# Patient Record
(demographics unavailable — no encounter records)

---

## 2025-01-23 NOTE — BEGINNING OF VISIT
[1] : 2) Feeling down, depressed, or hopeless for several days (1) [Provided Coping Management Support] : Provided Coping Management Support [Pain Scale: ___] : On a scale of 1-10, today the patient's pain is a(n) [unfilled]. [Referred to Palliative/Pain Management] : Referred to Palliative/Pain Management [> 15 Years] : > 15 Years [Diarrhea Character] : Diarrhea: Grade 0 [[id number]] : 2 [Former] : Former [0-4] : 0-4 [Date Discussed (MM/DD/YY): ___] : Discussed: [unfilled] [With Patient/Caregiver] : with Patient/Caregiver [Abdominal Pain] : no abdominal pain [Vomiting] : no vomiting [Constipation] : no constipation

## 2025-01-23 NOTE — BEGINNING OF VISIT
[1] : 2) Feeling down, depressed, or hopeless for several days (1) [Provided Coping Management Support] : Provided Coping Management Support [Pain Scale: ___] : On a scale of 1-10, today the patient's pain is a(n) [unfilled]. [Referred to Palliative/Pain Management] : Referred to Palliative/Pain Management [> 15 Years] : > 15 Years [Diarrhea Character] : Diarrhea: Grade 0 [[id number]] : 2 [Former] : Former [0-4] : 0-4 [Date Discussed (MM/DD/YY): ___] : Discussed: [unfilled] [With Patient/Caregiver] : with Patient/Caregiver [Abdominal Pain] : no abdominal pain [Vomiting] : no vomiting [Constipation] : no constipation

## 2025-01-23 NOTE — BEGINNING OF VISIT
[1] : 2) Feeling down, depressed, or hopeless for several days (1) [Provided Coping Management Support] : Provided Coping Management Support [Pain Scale: ___] : On a scale of 1-10, today the patient's pain is a(n) [unfilled]. [Referred to Palliative/Pain Management] : Referred to Palliative/Pain Management [> 15 Years] : > 15 Years [Diarrhea Character] : Diarrhea: Grade 0 [[id number]] : 2 [Former] : Former [0-4] : 0-4 [Date Discussed (MM/DD/YY): ___] : Discussed: [unfilled] [With Patient/Caregiver] : with Patient/Caregiver [Abdominal Pain] : no abdominal pain [Vomiting] : no vomiting [Constipation] : no constipation

## 2025-01-23 NOTE — BEGINNING OF VISIT
[1] : 2) Feeling down, depressed, or hopeless for several days (1) [Provided Coping Management Support] : Provided Coping Management Support [Pain Scale: ___] : On a scale of 1-10, today the patient's pain is a(n) [unfilled]. [Referred to Palliative/Pain Management] : Referred to Palliative/Pain Management [> 15 Years] : > 15 Years [Diarrhea Character] : Diarrhea: Grade 0 [[id number]] : 2 [Former] : Former [0-4] : 0-4 [Date Discussed (MM/DD/YY): ___] : Discussed: [unfilled] [With Patient/Caregiver] : with Patient/Caregiver [Abdominal Pain] : no abdominal pain [Vomiting] : no vomiting [Constipation] : no constipation

## 2025-01-23 NOTE — BEGINNING OF VISIT
[1] : 2) Feeling down, depressed, or hopeless for several days (1) [Provided Coping Management Support] : Provided Coping Management Support [Pain Scale: ___] : On a scale of 1-10, today the patient's pain is a(n) [unfilled]. [Referred to Palliative/Pain Management] : Referred to Palliative/Pain Management [> 15 Years] : > 15 Years [Diarrhea Character] : Diarrhea: Grade 0 [[id number]] : 2 [Former] : Former [0-4] : 0-4 [Date Discussed (MM/DD/YY): ___] : Discussed: [unfilled] [With Patient/Caregiver] : with Patient/Caregiver [Abdominal Pain] : no abdominal pain [Vomiting] : no vomiting [Constipation] : no constipation

## 2025-01-24 NOTE — ASSESSMENT
[With Patient/Caregiver] : With Patient/Caregiver [Designated Health Care Proxy] : Designated Health Care Proxy [Name: ___] : Name: [unfilled] [Relationship: ___] : Relationship: [unfilled] [FreeTextEntry1] : 58 year old postmenopausal woman with bilateral synchronous breast cancers S/P bilateral mastectomies: Left Stage I ([id number]) invasive ductal carcinoma ER pos [place] neg HER2 1+ Right Stage 2 (T2=3.0cmN1) invasive ductal carcinoma, gr 3 inc Ki67,  ER/[place] pos HER2 2+, FISH neg, ORS 24 Pathology and prognosis reviewed in detail S/P RT  On anastrozole since 8/2024 - to continue tolerating well Agree with plan to add abemaciclib. Side effects and rationale reviewed today. Would start at 100mg bid . Also discussed kisquali. Agree with adjuvant bisphosphonate once stable on abemaciclib. Pt to f/u with Rheumatology MUTYH mutation autosomal recessive - no cancer implications.  Reviewed diet, wt loss encouraged, inc exercise, limit etoh, adeq ca++ and D    All of the patient's questions were adequately addressed and answered during today's consultation. Our discussion covered a range of topics including diagnosis, prognosis, treatment options, potential risks and alternatives, as well as overall care. The patient expressed agreement with the proposed plan. She was instructed to reach out to me if she had any further questions or if there were any changes in her clinical condition     [AdvancecareDate] : 1/23/25

## 2025-01-24 NOTE — REVIEW OF SYSTEMS
[Diarrhea: Grade 0] : Diarrhea: Grade 0 [Joint Pain] : joint pain [Anxiety] : anxiety [Hot Flashes] : hot flashes [Negative] : Allergic/Immunologic [FreeTextEntry8] : dryness, knees shoulders [FreeTextEntry9] : hands

## 2025-01-24 NOTE — PHYSICAL EXAM
[Fully active, able to carry on all pre-disease performance without restriction] : Status 0 - Fully active, able to carry on all pre-disease performance without restriction [Normal] : normal spine exam without palpable tenderness, no kyphosis or scoliosis [de-identified] : S/p bilat mastectomy Rt RT - o palp mass or LN

## 2025-01-24 NOTE — ASSESSMENT
[With Patient/Caregiver] : With Patient/Caregiver [Designated Health Care Proxy] : Designated Health Care Proxy [Name: ___] : Name: [unfilled] [Relationship: ___] : Relationship: [unfilled] [FreeTextEntry1] : 58 year old postmenopausal woman with bilateral synchronous breast cancers S/P bilateral mastectomies: Left Stage I ([id number]) invasive ductal carcinoma ER pos [place] neg HER2 1+ Right Stage 2 (T2=3.0cmN1) invasive ductal carcinoma, gr 3 inc Ki67,  ER/[place] pos HER2 2+, FISH neg, ORS 24 Pathology and prognosis reviewed in detail S/P RT  On anastrozole since 8/2024 - to continue tolerating well Agree with plan to add abemaciclib. Side effects and rationale reviewed today. Would start at 100mg bid . Also discussed kisquali. Agree with adjuvant bisphosphonate once stable on abemaciclib. Pt to f/u with Rheumatology MUTYH mutation autosomal recessive - no cancer implications.  Reviewed diet, wt loss encouraged, inc exercise, limit etoh, adeq ca++ and D    All of the patient's questions were adequately addressed and answered during today's consultation. Our discussion covered a range of topics including diagnosis, prognosis, treatment options, potential risks and alternatives, as well as overall care. The patient expressed agreement with the proposed plan. She was instructed to reach out to me if she had any further questions or if there were any changes in her clinical condition     [AdvancecareDate] : 1/23/25

## 2025-01-24 NOTE — PHYSICAL EXAM
[Fully active, able to carry on all pre-disease performance without restriction] : Status 0 - Fully active, able to carry on all pre-disease performance without restriction [Normal] : normal spine exam without palpable tenderness, no kyphosis or scoliosis [de-identified] : S/p bilat mastectomy Rt RT - o palp mass or LN

## 2025-01-24 NOTE — HISTORY OF PRESENT ILLNESS
[de-identified] : 57 yo self-referred for evaluation of breast cancer  4/15/24 right breast biopsies (CBL): 12ocN5 invasive ductal carcinoma, 1.6cm. ER>90 [place] 59% HER2 1+ ki67 44 2ocN2 DCIS 2024 breast MRI with right 12ocN5 2.9cm mass (known carcinoma); right central 0.5cm mass; left uiq 3.1cm mass. No lymphadenopathy  5/10/24 left breast MRI guided biopsy DCIS ER>95 [place]<1 24 bilateral mastectomies (Hu) with: Left: microinvasive carcinoma, 1mm. No LVI. 0/1 positive sentinel lymph node. ER>95 [place]<1 HER2 1+ Left M9fiyS2 RIGHT invasive ductal carcinoma, 30mm. Grade 3. +LVI. 1/3 sentinel lymph node with metastasis. ER>95 [place] 75 ki67 20-25 HER2 2+ FISH negative Oncotype  dx recurrence score= 24 drr 7% Right T2=3cmN1  XRT Dr NG 2024 anastrozole   BMD osteopenia  PMH Malignant neoplasm of breast	  Personal history of radiation therapy	  RA (rheumatoid arthritis)	2005 Vitamin D deficiency	  On antibody therapy for RA- well controlled. 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 . 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  Tobacco: none smoked  in 's Alcohol: 3 drinks per week Exercise: active walking

## 2025-01-24 NOTE — HISTORY OF PRESENT ILLNESS
[de-identified] : 59 yo self-referred for evaluation of breast cancer  4/15/24 right breast biopsies (CBL): 12ocN5 invasive ductal carcinoma, 1.6cm. ER>90 [place] 59% HER2 1+ ki67 44 2ocN2 DCIS 2024 breast MRI with right 12ocN5 2.9cm mass (known carcinoma); right central 0.5cm mass; left uiq 3.1cm mass. No lymphadenopathy  5/10/24 left breast MRI guided biopsy DCIS ER>95 [place]<1 24 bilateral mastectomies (Hu) with: Left: microinvasive carcinoma, 1mm. No LVI. 0/1 positive sentinel lymph node. ER>95 MD<1 HER2 1+ Left M4qbqP8 RIGHT invasive ductal carcinoma, 30mm. Grade 3. +LVI. 1/3 sentinel lymph node with metastasis. ER>95 MD 75 ki67 20-25 HER2 2+ FISH negative Oncotype  dx recurrence score= 24 drr 7% Right T2=3cmN1  XRT Dr NG 2024 anastrozole   BMD osteopenia  PMH Malignant neoplasm of breast	  Personal history of radiation therapy	  RA (rheumatoid arthritis)	2005 Vitamin D deficiency	  On antibody therapy for RA- well controlled. 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  Tobacco: none smoked  in 's Alcohol: 3 drinks per week Exercise: active walking

## 2025-01-24 NOTE — ASSESSMENT
[With Patient/Caregiver] : With Patient/Caregiver [Designated Health Care Proxy] : Designated Health Care Proxy [Name: ___] : Name: [unfilled] [Relationship: ___] : Relationship: [unfilled] [FreeTextEntry1] : 58 year old postmenopausal woman with bilateral synchronous breast cancers S/P bilateral mastectomies: Left Stage I ([id number]) invasive ductal carcinoma ER pos [place] neg HER2 1+ Right Stage 2 (T2=3.0cmN1) invasive ductal carcinoma, gr 3 inc Ki67,  ER/OR pos HER2 2+, FISH neg, ORS 24 Pathology and prognosis reviewed in detail S/P RT  On anastrozole since 8/2024 - to continue tolerating well Agree with plan to add abemaciclib. Side effects and rationale reviewed today. Would start at 100mg bid . Also discussed kisquali. Agree with adjuvant bisphosphonate once stable on abemaciclib. Pt to f/u with Rheumatology MUTYH mutation autosomal recessive - no cancer implications.  Reviewed diet, wt loss encouraged, inc exercise, limit etoh, adeq ca++ and D    All of the patient's questions were adequately addressed and answered during today's consultation. Our discussion covered a range of topics including diagnosis, prognosis, treatment options, potential risks and alternatives, as well as overall care. The patient expressed agreement with the proposed plan. She was instructed to reach out to me if she had any further questions or if there were any changes in her clinical condition     [AdvancecareDate] : 1/23/25

## 2025-01-24 NOTE — HISTORY OF PRESENT ILLNESS
[de-identified] : 57 yo self-referred for evaluation of breast cancer  4/15/24 right breast biopsies (CBL): 12ocN5 invasive ductal carcinoma, 1.6cm. ER>90 [place] 59% HER2 1+ ki67 44 2ocN2 DCIS 2024 breast MRI with right 12ocN5 2.9cm mass (known carcinoma); right central 0.5cm mass; left uiq 3.1cm mass. No lymphadenopathy  5/10/24 left breast MRI guided biopsy DCIS ER>95 [place]<1 24 bilateral mastectomies (Hu) with: Left: microinvasive carcinoma, 1mm. No LVI. 0/1 positive sentinel lymph node. ER>95 [place]<1 HER2 1+ Left M0rptB3 RIGHT invasive ductal carcinoma, 30mm. Grade 3. +LVI. 1/3 sentinel lymph node with metastasis. ER>95 [place] 75 ki67 20-25 HER2 2+ FISH negative Oncotype  dx recurrence score= 24 drr 7% Right T2=3cmN1  XRT Dr NG 2024 anastrozole   BMD osteopenia  PMH Malignant neoplasm of breast	  Personal history of radiation therapy	  RA (rheumatoid arthritis)	2005 Vitamin D deficiency	  On antibody therapy for RA- well controlled. 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  Tobacco: none smoked  in 's Alcohol: 3 drinks per week Exercise: active walking

## 2025-01-24 NOTE — PHYSICAL EXAM
[Fully active, able to carry on all pre-disease performance without restriction] : Status 0 - Fully active, able to carry on all pre-disease performance without restriction [Normal] : normal spine exam without palpable tenderness, no kyphosis or scoliosis [de-identified] : S/p bilat mastectomy Rt RT - o palp mass or LN

## 2025-01-24 NOTE — PHYSICAL EXAM
[Fully active, able to carry on all pre-disease performance without restriction] : Status 0 - Fully active, able to carry on all pre-disease performance without restriction [Normal] : normal spine exam without palpable tenderness, no kyphosis or scoliosis [de-identified] : S/p bilat mastectomy Rt RT - o palp mass or LN

## 2025-01-24 NOTE — PHYSICAL EXAM
[Fully active, able to carry on all pre-disease performance without restriction] : Status 0 - Fully active, able to carry on all pre-disease performance without restriction [Normal] : normal spine exam without palpable tenderness, no kyphosis or scoliosis [de-identified] : S/p bilat mastectomy Rt RT - o palp mass or LN

## 2025-01-24 NOTE — HISTORY OF PRESENT ILLNESS
[de-identified] : 59 yo self-referred for evaluation of breast cancer  4/15/24 right breast biopsies (CBL): 12ocN5 invasive ductal carcinoma, 1.6cm. ER>90 [place] 59% HER2 1+ ki67 44 2ocN2 DCIS 2024 breast MRI with right 12ocN5 2.9cm mass (known carcinoma); right central 0.5cm mass; left uiq 3.1cm mass. No lymphadenopathy  5/10/24 left breast MRI guided biopsy DCIS ER>95 [place]<1 24 bilateral mastectomies (Hu) with: Left: microinvasive carcinoma, 1mm. No LVI. 0/1 positive sentinel lymph node. ER>95 [place]<1 HER2 1+ Left U7sxcS6 RIGHT invasive ductal carcinoma, 30mm. Grade 3. +LVI. 1/3 sentinel lymph node with metastasis. ER>95 [place] 75 ki67 20-25 HER2 2+ FISH negative Oncotype  dx recurrence score= 24 drr 7% Right T2=3cmN1  XRT Dr NG 2024 anastrozole   BMD osteopenia  PMH Malignant neoplasm of breast	  Personal history of radiation therapy	  RA (rheumatoid arthritis)	2005 Vitamin D deficiency	  On antibody therapy for RA- well controlled. 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  Tobacco: none smoked  in 's Alcohol: 3 drinks per week Exercise: active walking

## 2025-01-24 NOTE — ASSESSMENT
[With Patient/Caregiver] : With Patient/Caregiver [Designated Health Care Proxy] : Designated Health Care Proxy [Name: ___] : Name: [unfilled] [Relationship: ___] : Relationship: [unfilled] [FreeTextEntry1] : 58 year old postmenopausal woman with bilateral synchronous breast cancers S/P bilateral mastectomies: Left Stage I (P7ufsF1) invasive ductal carcinoma ER pos [place] neg HER2 1+ Right Stage 2 (T2=3.0cmN1) invasive ductal carcinoma, gr 3 inc Ki67,  ER/[place] pos HER2 2+, FISH neg, ORS 24 Pathology and prognosis reviewed in detail S/P RT  On anastrozole since 8/2024 - to continue tolerating well Agree with plan to add abemaciclib. Side effects and rationale reviewed today. Would start at 100mg bid . Also discussed kisquali. Agree with adjuvant bisphosphonate once stable on abemaciclib. Pt to f/u with Rheumatology MUTYH mutation autosomal recessive - no cancer implications.  Reviewed diet, wt loss encouraged, inc exercise, limit etoh, adeq ca++ and D    All of the patient's questions were adequately addressed and answered during today's consultation. Our discussion covered a range of topics including diagnosis, prognosis, treatment options, potential risks and alternatives, as well as overall care. The patient expressed agreement with the proposed plan. She was instructed to reach out to me if she had any further questions or if there were any changes in her clinical condition     [AdvancecareDate] : 1/23/25

## 2025-01-24 NOTE — HISTORY OF PRESENT ILLNESS
[de-identified] : 57 yo self-referred for evaluation of breast cancer  4/15/24 right breast biopsies (CBL): 12ocN5 invasive ductal carcinoma, 1.6cm. ER>90 [place] 59% HER2 1+ ki67 44 2ocN2 DCIS 2024 breast MRI with right 12ocN5 2.9cm mass (known carcinoma); right central 0.5cm mass; left uiq 3.1cm mass. No lymphadenopathy  5/10/24 left breast MRI guided biopsy DCIS ER>95 [place]<1 24 bilateral mastectomies (Hu) with: Left: microinvasive carcinoma, 1mm. No LVI. 0/1 positive sentinel lymph node. ER>95 [place]<1 HER2 1+ Left J2tqhU0 RIGHT invasive ductal carcinoma, 30mm. Grade 3. +LVI. 1/3 sentinel lymph node with metastasis. ER>95 [place] 75 ki67 20-25 HER2 2+ FISH negative Oncotype  dx recurrence score= 24 drr 7% Right T2=3cmN1  XRT Dr NG 2024 anastrozole   BMD osteopenia  PMH Malignant neoplasm of breast	  Personal history of radiation therapy	  RA (rheumatoid arthritis)	2005 Vitamin D deficiency	  On antibody therapy for RA- well controlled. 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  Tobacco: none smoked  in 's Alcohol: 3 drinks per week Exercise: active walking

## 2025-01-24 NOTE — ASSESSMENT
[With Patient/Caregiver] : With Patient/Caregiver [Designated Health Care Proxy] : Designated Health Care Proxy [Name: ___] : Name: [unfilled] [Relationship: ___] : Relationship: [unfilled] [FreeTextEntry1] : 58 year old postmenopausal woman with bilateral synchronous breast cancers S/P bilateral mastectomies: Left Stage I ([id number]) invasive ductal carcinoma ER pos [place] neg HER2 1+ Right Stage 2 (T2=3.0cmN1) invasive ductal carcinoma, gr 3 inc Ki67,  ER/[place] pos HER2 2+, FISH neg, ORS 24 Pathology and prognosis reviewed in detail S/P RT  On anastrozole since 8/2024 - to continue tolerating well Agree with plan to add abemaciclib. Side effects and rationale reviewed today. Would start at 100mg bid . Also discussed kisquali. Agree with adjuvant bisphosphonate once stable on abemaciclib. Pt to f/u with Rheumatology MUTYH mutation autosomal recessive - no cancer implications.  Reviewed diet, wt loss encouraged, inc exercise, limit etoh, adeq ca++ and D    All of the patient's questions were adequately addressed and answered during today's consultation. Our discussion covered a range of topics including diagnosis, prognosis, treatment options, potential risks and alternatives, as well as overall care. The patient expressed agreement with the proposed plan. She was instructed to reach out to me if she had any further questions or if there were any changes in her clinical condition     [AdvancecareDate] : 1/23/25

## 2025-07-16 NOTE — ASSESSMENT
[With Patient/Caregiver] : With Patient/Caregiver [Designated Health Care Proxy] : Designated Health Care Proxy [Name: ___] : Name: [unfilled] [Relationship: ___] : Relationship: [unfilled] [FreeTextEntry1] : 58 year old postmenopausal woman with bilateral synchronous breast cancers S/P bilateral mastectomies: Left Stage I ([id number]) invasive ductal carcinoma ER pos [place] neg HER2 1+ Right Stage 2 (T2=3.0cmN1) invasive ductal carcinoma, gr 3 inc Ki67,  ER/[place] pos HER2 2+, FISH neg, ORS 24 Pathology and prognosis reviewed in detail S/P RT  On anastrozole since 8/2024 - to continue tolerating well Agree with plan to add abemaciclib. Side effects and rationale reviewed today. Would start at 100mg bid . Also discussed kisquali. Agree with adjuvant bisphosphonate once stable on abemaciclib. Pt to f/u with Rheumatology MUTYH mutation autosomal recessive - no cancer implications.  Reviewed diet, wt loss encouraged, inc exercise, limit etoh, adeq ca++ and D    All of the patient's questions were adequately addressed and answered during today's consultation. Our discussion covered a range of topics including diagnosis, prognosis, treatment options, potential risks and alternatives, as well as overall care. The patient expressed agreement with the proposed plan. She was instructed to reach out to me if she had any further questions or if there were any changes in her clinical condition     [AdvancecareDate] : 1/23/25

## 2025-07-16 NOTE — HISTORY OF PRESENT ILLNESS
[de-identified] : 59 yo self-referred for evaluation of breast cancer  4/15/24 right breast biopsies (CBL): 12ocN5 invasive ductal carcinoma, 1.6cm. ER>90 [place] 59% HER2 1+ ki67 44 2ocN2 DCIS 2024 breast MRI with right 12ocN5 2.9cm mass (known carcinoma); right central 0.5cm mass; left uiq 3.1cm mass. No lymphadenopathy  5/10/24 left breast MRI guided biopsy DCIS ER>95 [place]<1 24 bilateral mastectomies (Hu) with: Left: microinvasive carcinoma, 1mm. No LVI. 0/1 positive sentinel lymph node. ER>95 [place]<1 HER2 1+ Left P3rbhS3 RIGHT invasive ductal carcinoma, 30mm. Grade 3. +LVI. 1/3 sentinel lymph node with metastasis. ER>95 [place] 75 ki67 20-25 HER2 2+ FISH negative Oncotype  dx recurrence score= 24 drr 7% Right T2=3cmN1  XRT Dr NG 2024 anastrozole   BMD osteopenia  PMH Malignant neoplasm of breast	  Personal history of radiation therapy	  RA (rheumatoid arthritis)	2005 Vitamin D deficiency	  On antibody therapy for RA- well controlled. 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  Tobacco: none smoked  in 's Alcohol: 3 drinks per week Exercise: active walking      	    [de-identified] : 7/16/25 BMD 9/26/24 - osteopenia colonoscopy 2023 gyn s/p b/l mastectomies 6/24 w/ Dr. Hu continues anastrozole (started 8/24) starting Verzenio? continues weeekly vitamin D

## 2025-07-16 NOTE — PHYSICAL EXAM
[Fully active, able to carry on all pre-disease performance without restriction] : Status 0 - Fully active, able to carry on all pre-disease performance without restriction [Normal] : affect appropriate [de-identified] : S/p bilat mastectomy Rt RT - o palp mass or LN

## 2025-07-16 NOTE — PHYSICAL EXAM
[Fully active, able to carry on all pre-disease performance without restriction] : Status 0 - Fully active, able to carry on all pre-disease performance without restriction [Normal] : affect appropriate [de-identified] : S/p bilat mastectomy Rt RT - o palp mass or LN

## 2025-07-16 NOTE — HISTORY OF PRESENT ILLNESS
[de-identified] : 59 yo self-referred for evaluation of breast cancer  4/15/24 right breast biopsies (CBL): 12ocN5 invasive ductal carcinoma, 1.6cm. ER>90 [place] 59% HER2 1+ ki67 44 2ocN2 DCIS 2024 breast MRI with right 12ocN5 2.9cm mass (known carcinoma); right central 0.5cm mass; left uiq 3.1cm mass. No lymphadenopathy  5/10/24 left breast MRI guided biopsy DCIS ER>95 MD<1 24 bilateral mastectomies (Hu) with: Left: microinvasive carcinoma, 1mm. No LVI. 0/1 positive sentinel lymph node. ER>95 MD<1 HER2 1+ Left E4enwG4 RIGHT invasive ductal carcinoma, 30mm. Grade 3. +LVI. 1/3 sentinel lymph node with metastasis. ER>95 MD 75 ki67 20-25 HER2 2+ FISH negative Oncotype  dx recurrence score= 24 drr 7% Right T2=3cmN1  XRT Dr NG 2024 anastrozole   BMD osteopenia  PMH Malignant neoplasm of breast	  Personal history of radiation therapy	  RA (rheumatoid arthritis)	2005 Vitamin D deficiency	  On antibody therapy for RA- well controlled. HSS Tawny Ragland  Family History fa is 100yo, mother 92 No family history of breast, panc prostate or ovarian cancer Genetic testing - MUTYH mutation autosomal recessive - no cancer implications   Menarche Age at Menopause: 54 Parity: [id number], c section 39 heterosexual - not active Hormonal therapy: none, ocp intermittent BF no GYN  Colonoscopy   s/p R knee replacement  Social History Lives in [place] with 19yo daughter. . 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  Tobacco: none smoked  in 's Alcohol: 3 drinks per week Exercise: active walking      	    [de-identified] : 7/16/25 BMD 9/26/24 - osteopenia colonoscopy 2023 gyn s/p b/l mastectomies 6/24 w/ Dr. Hu continues anastrozole (started 8/24) starting Verzenio? continues weeekly vitamin D

## 2025-07-16 NOTE — ASSESSMENT
[With Patient/Caregiver] : With Patient/Caregiver [Designated Health Care Proxy] : Designated Health Care Proxy [Name: ___] : Name: [unfilled] [Relationship: ___] : Relationship: [unfilled] [FreeTextEntry1] : 58 year old postmenopausal woman with bilateral synchronous breast cancers S/P bilateral mastectomies: Left Stage I ([id number]) invasive ductal carcinoma ER pos IN neg HER2 1+ Right Stage 2 (T2=3.0cmN1) invasive ductal carcinoma, gr 3 inc Ki67,  ER/IN pos HER2 2+, FISH neg, ORS 24 Pathology and prognosis reviewed in detail S/P RT  On anastrozole since 8/2024 - to continue tolerating well Agree with plan to add abemaciclib. Side effects and rationale reviewed today. Would start at 100mg bid . Also discussed kisquali. Agree with adjuvant bisphosphonate once stable on abemaciclib. Pt to f/u with Rheumatology MUTYH mutation autosomal recessive - no cancer implications.  Reviewed diet, wt loss encouraged, inc exercise, limit etoh, adeq ca++ and D    All of the patient's questions were adequately addressed and answered during today's consultation. Our discussion covered a range of topics including diagnosis, prognosis, treatment options, potential risks and alternatives, as well as overall care. The patient expressed agreement with the proposed plan. She was instructed to reach out to me if she had any further questions or if there were any changes in her clinical condition     [AdvancecareDate] : 1/23/25

## 2025-07-16 NOTE — PHYSICAL EXAM
[Fully active, able to carry on all pre-disease performance without restriction] : Status 0 - Fully active, able to carry on all pre-disease performance without restriction [Normal] : affect appropriate [de-identified] : S/p bilat mastectomy Rt RT - o palp mass or LN

## 2025-07-16 NOTE — HISTORY OF PRESENT ILLNESS
[de-identified] : 57 yo self-referred for evaluation of breast cancer  4/15/24 right breast biopsies (CBL): 12ocN5 invasive ductal carcinoma, 1.6cm. ER>90 WI 59% HER2 1+ ki67 44 2ocN2 DCIS 2024 breast MRI with right 12ocN5 2.9cm mass (known carcinoma); right central 0.5cm mass; left uiq 3.1cm mass. No lymphadenopathy  5/10/24 left breast MRI guided biopsy DCIS ER>95 WI<1 24 bilateral mastectomies (Hu) with: Left: microinvasive carcinoma, 1mm. No LVI. 0/1 positive sentinel lymph node. ER>95 WI<1 HER2 1+ Left [id number] RIGHT invasive ductal carcinoma, 30mm. Grade 3. +LVI. 1/3 sentinel lymph node with metastasis. ER>95 WI 75 ki67 20-25 HER2 2+ FISH negative Oncotype  dx recurrence score= 24 drr 7% Right T2=3cmN1  XRT Dr NG 2024 anastrozole   BMD osteopenia  PMH Malignant neoplasm of breast	  Personal history of radiation therapy	  RA (rheumatoid arthritis)	2005 Vitamin D deficiency	  On antibody therapy for RA- well controlled. HSS Tawny Ragland  Family History fa is 100yo, mother 92 No family history of breast, panc prostate or ovarian cancer Genetic testing - MUTYH mutation autosomal recessive - no cancer implications   Menarche Age at Menopause: 54 Parity: [id number], c section 39 heterosexual - not active Hormonal therapy: none, ocp intermittent BF no GYN  Colonoscopy   s/p R knee replacement  Social History Lives in [place] with 17yo daughter. . 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  Tobacco: none smoked  in 's Alcohol: 3 drinks per week Exercise: active walking      	    [de-identified] : 7/16/25 BMD 9/26/24 - osteopenia colonoscopy 2023 gyn s/p b/l mastectomies 6/24 w/ Dr. Hu continues anastrozole (started 8/24) starting Verzenio? continues weeekly vitamin D

## 2025-07-16 NOTE — ASSESSMENT
[With Patient/Caregiver] : With Patient/Caregiver [Designated Health Care Proxy] : Designated Health Care Proxy [Name: ___] : Name: [unfilled] [Relationship: ___] : Relationship: [unfilled] [FreeTextEntry1] : 58 year old postmenopausal woman with bilateral synchronous breast cancers S/P bilateral mastectomies: Left Stage I ([id number]) invasive ductal carcinoma ER pos IN neg HER2 1+ Right Stage 2 (T2=3.0cmN1) invasive ductal carcinoma, gr 3 inc Ki67,  ER/IN pos HER2 2+, FISH neg, ORS 24 Pathology and prognosis reviewed in detail S/P RT  On anastrozole since 8/2024 - to continue tolerating well Agree with plan to add abemaciclib. Side effects and rationale reviewed today. Would start at 100mg bid . Also discussed kisquali. Agree with adjuvant bisphosphonate once stable on abemaciclib. Pt to f/u with Rheumatology MUTYH mutation autosomal recessive - no cancer implications.  Reviewed diet, wt loss encouraged, inc exercise, limit etoh, adeq ca++ and D    All of the patient's questions were adequately addressed and answered during today's consultation. Our discussion covered a range of topics including diagnosis, prognosis, treatment options, potential risks and alternatives, as well as overall care. The patient expressed agreement with the proposed plan. She was instructed to reach out to me if she had any further questions or if there were any changes in her clinical condition     [AdvancecareDate] : 1/23/25